# Patient Record
Sex: FEMALE | Race: WHITE | Employment: FULL TIME | URBAN - METROPOLITAN AREA
[De-identification: names, ages, dates, MRNs, and addresses within clinical notes are randomized per-mention and may not be internally consistent; named-entity substitution may affect disease eponyms.]

---

## 2017-01-30 ENCOUNTER — OFFICE VISIT (OUTPATIENT)
Dept: NEUROLOGY | Facility: CLINIC | Age: 57
End: 2017-01-30

## 2017-01-30 VITALS
DIASTOLIC BLOOD PRESSURE: 72 MMHG | SYSTOLIC BLOOD PRESSURE: 108 MMHG | BODY MASS INDEX: 33 KG/M2 | RESPIRATION RATE: 14 BRPM | WEIGHT: 194 LBS | HEART RATE: 78 BPM

## 2017-01-30 DIAGNOSIS — G40.909 SEIZURE DISORDER (HCC): Primary | ICD-10-CM

## 2017-01-30 PROCEDURE — 99215 OFFICE O/P EST HI 40 MIN: CPT | Performed by: OTHER

## 2017-01-30 RX ORDER — OMEPRAZOLE 40 MG/1
CAPSULE, DELAYED RELEASE ORAL DAILY
Refills: 6 | COMMUNITY
Start: 2017-01-04 | End: 2019-05-13 | Stop reason: ALTCHOICE

## 2017-01-30 RX ORDER — LAMOTRIGINE 200 MG/1
200 TABLET ORAL DAILY
Qty: 90 TABLET | Refills: 3 | Status: SHIPPED | OUTPATIENT
Start: 2017-01-30 | End: 2017-11-20

## 2017-01-30 NOTE — PROGRESS NOTES
[facility] Neurology outpatient progress note  Date of service: 1/30/2017    Patient here to follow up regarding seizures. States she has been doing well, here to discuss the next steps. She is on lamictal 200 mg nightly, no adverse effect reported.   Himanshu Shelton Hypertension Mother      Neurological examination:  /72 mmHg  Pulse 78  Resp 14  Wt 194 lb  LMP 12/24/2015  A & O X 3   Language - nl   Speech - nl   CN - intact   Motor 5/5 all extremities   Tone - nl   DTRs 2+ symmetric  Plantar response: bilateral

## 2017-01-30 NOTE — PROGRESS NOTES
Patient here to follow up regarding seizures. States she has been doing well, here to discuss the next steps.

## 2017-01-30 NOTE — PATIENT INSTRUCTIONS
Refill policies:    • Allow 2 business days for refills; controlled substances may take longer.   • Contact your pharmacy at least 5 days prior to running out of medication and have them send an electronic request or submit request through the “request re your physician has recommended that you have a procedure or additional testing performed. [facility] will contact your insurance carrier to obtain pre-certification or prior authorization.     Unfortunately, PAPI has seen an increas

## 2017-11-20 DIAGNOSIS — G40.909 SEIZURE DISORDER (HCC): Primary | ICD-10-CM

## 2017-11-20 RX ORDER — LAMOTRIGINE 200 MG/1
200 TABLET ORAL DAILY
Qty: 90 TABLET | Refills: 0 | Status: SHIPPED | OUTPATIENT
Start: 2017-11-20 | End: 2018-01-04

## 2017-11-20 NOTE — TELEPHONE ENCOUNTER
Medication: Lamictal    Date of last refill: 1/4/17  [place]    Last office visit: 1/30/17  Due back to clinic per last office note:  1 year  Date next office visit scheduled:  1/4/18    Last OV note recommendation: p

## 2018-01-04 ENCOUNTER — OFFICE VISIT (OUTPATIENT)
Dept: NEUROLOGY | Facility: CLINIC | Age: 58
End: 2018-01-04

## 2018-01-04 VITALS
HEART RATE: 84 BPM | RESPIRATION RATE: 16 BRPM | DIASTOLIC BLOOD PRESSURE: 72 MMHG | SYSTOLIC BLOOD PRESSURE: 110 MMHG | WEIGHT: 196 LBS | BODY MASS INDEX: 34 KG/M2

## 2018-01-04 DIAGNOSIS — G40.909 SEIZURE DISORDER (HCC): ICD-10-CM

## 2018-01-04 PROCEDURE — 99214 OFFICE O/P EST MOD 30 MIN: CPT | Performed by: OTHER

## 2018-01-04 RX ORDER — LAMOTRIGINE 200 MG/1
200 TABLET ORAL DAILY
Qty: 90 TABLET | Refills: 4 | Status: SHIPPED | OUTPATIENT
Start: 2018-01-04 | End: 2019-02-25

## 2018-01-04 NOTE — PATIENT INSTRUCTIONS
Refill policies:    • Allow 2-3 business days for refills; controlled substances may take longer.   • Contact your pharmacy at least 5 days prior to running out of medication and have them send an electronic request or submit request through the [facility] have a procedure or additional testing performed. [facility]) will contact your insurance carrier to obtain pre-certification or prior authorization.     Unfortunately, PAPI has seen an increase in denial of payment even though the p

## 2018-01-04 NOTE — PROGRESS NOTES
[facility] Neurology outpatient progress note  Date of service: 1/4/2018    Patient here to follow up regarding seizures. States she has been doing well, no seizure since last visit. Tolerating current medication with no side effects.    She is on lamictal 200 mg Neurological Examination:  /72   Pulse 84   Resp 16   Wt 196 lb   LMP 12/24/2015   BMI 33.64 kg/m²   Mental status: A & O X 3  Language: no aphasia  Speech: no dysarthria  CN II-XII: intact   Motor strength: 5/5 all extremities  Tone: normal  DTRs:

## 2018-01-04 NOTE — PROGRESS NOTES
Patient here to follow up regarding seizures. States she has been doing well, no seizures. Here to discuss the next steps.

## 2018-01-21 ENCOUNTER — OFFICE VISIT (OUTPATIENT)
Dept: FAMILY MEDICINE CLINIC | Facility: CLINIC | Age: 58
End: 2018-01-21

## 2018-01-21 VITALS
TEMPERATURE: 98 F | OXYGEN SATURATION: 96 % | HEART RATE: 107 BPM | BODY MASS INDEX: 34 KG/M2 | SYSTOLIC BLOOD PRESSURE: 110 MMHG | RESPIRATION RATE: 18 BRPM | WEIGHT: 199 LBS | DIASTOLIC BLOOD PRESSURE: 60 MMHG

## 2018-01-21 DIAGNOSIS — J06.9 ACUTE URI: Primary | ICD-10-CM

## 2018-01-21 DIAGNOSIS — Z87.09 HISTORY OF BRONCHITIS: ICD-10-CM

## 2018-01-21 DIAGNOSIS — Z87.01 HISTORY OF PNEUMONIA: ICD-10-CM

## 2018-01-21 PROCEDURE — 99203 OFFICE O/P NEW LOW 30 MIN: CPT | Performed by: NURSE PRACTITIONER

## 2018-01-21 RX ORDER — AZITHROMYCIN 250 MG/1
TABLET, FILM COATED ORAL
Qty: 6 TABLET | Refills: 0 | Status: SHIPPED | OUTPATIENT
Start: 2018-01-21 | End: 2019-05-13 | Stop reason: ALTCHOICE

## 2018-01-21 RX ORDER — ALBUTEROL SULFATE 90 UG/1
2 AEROSOL, METERED RESPIRATORY (INHALATION) EVERY 6 HOURS PRN
Qty: 1 INHALER | Refills: 0 | Status: SHIPPED | OUTPATIENT
Start: 2018-01-21 | End: 2019-05-13 | Stop reason: ALTCHOICE

## 2018-01-21 NOTE — PROGRESS NOTES
CHIEF COMPLAINT:   Patient presents with:  Cough: pt c\o of uri x 1wk       HPI:   Jony Cole is a 65year old female who presents for upper respiratory symptoms for  1 weeks.  Patient reports congestion, cough with white colored sputum, wheezing, prior hist and in no apparent distress, afebrile  SKIN: no rashes, no suspicious lesions  HEAD: atraumatic, normocephalic.  mild tenderness on palpation of maxillarysinuses.   EYES: conjunctiva clear, EOM intact  EARS: TM's clear, no bulging, no retraction,clear right

## 2018-03-23 DIAGNOSIS — G40.909 SEIZURE DISORDER (HCC): ICD-10-CM

## 2018-03-23 RX ORDER — LAMOTRIGINE 200 MG/1
200 TABLET ORAL DAILY
Qty: 90 TABLET | Refills: 0 | OUTPATIENT
Start: 2018-03-23

## 2018-03-23 NOTE — TELEPHONE ENCOUNTER
Per Epic review, should have refills on file with requesting pharmacy. Pharmacy was still closed at 0930. Will call again later. Called and spoke with Samuel Sterling at pharmacy and confirmed that this was sent in error, patient has refills on file.  Refused at t

## 2019-02-25 DIAGNOSIS — G40.909 SEIZURE DISORDER (HCC): ICD-10-CM

## 2019-02-25 RX ORDER — LAMOTRIGINE 200 MG/1
200 TABLET ORAL DAILY
Qty: 30 TABLET | Refills: 0 | Status: SHIPPED | OUTPATIENT
Start: 2019-02-25 | End: 2019-02-28

## 2019-02-25 NOTE — TELEPHONE ENCOUNTER
Spoke with the pharmacist who states the patient was unable to obtain the last refill due to the prescription expiring.      Medication: Lamotrigine 200 MG Tablet    Date of last refill: 01/04/18 (#90/4)  [place]    L

## 2019-02-26 DIAGNOSIS — G40.909 SEIZURE DISORDER (HCC): ICD-10-CM

## 2019-02-26 RX ORDER — LAMOTRIGINE 200 MG/1
200 TABLET ORAL DAILY
Qty: 90 TABLET | Refills: 0 | OUTPATIENT
Start: 2019-02-26

## 2019-02-26 NOTE — TELEPHONE ENCOUNTER
Spoke with the pharmacist who states that the pharmacy rec'd the refill request and to disregard the refill.        Medication: LAMOTRIGINE 200 MG Oral Tab    Date of last refill: 02/25/19 (#30/0)  [place]    Last off

## 2019-02-28 DIAGNOSIS — G40.909 SEIZURE DISORDER (HCC): ICD-10-CM

## 2019-02-28 RX ORDER — LAMOTRIGINE 200 MG/1
200 TABLET ORAL DAILY
Qty: 30 TABLET | Refills: 2 | Status: SHIPPED | OUTPATIENT
Start: 2019-02-28 | End: 2019-06-05

## 2019-02-28 NOTE — TELEPHONE ENCOUNTER
The patient insurance is requesting a 90 day supply.      Medication: LAMOTRIGINE 200 MG TABLET    Date of last refill: 02/25/19 (#30/0)  [place]    Last office visit: 01/04/18  Due back to clinic per last office note

## 2019-03-12 ENCOUNTER — TELEPHONE (OUTPATIENT)
Dept: NEUROLOGY | Facility: CLINIC | Age: 59
End: 2019-03-12

## 2019-03-12 NOTE — TELEPHONE ENCOUNTER
Per Epic review, pt given refill on 2/28/19, #30/2 additional refills. Checked with Rosalina at pharmacy, confirmed there are still refills on file. Informed pt. Verbalized understanding. States she still has medication left at home too.     Is due for ap

## 2019-05-13 ENCOUNTER — APPOINTMENT (OUTPATIENT)
Dept: LAB | Age: 59
End: 2019-05-13
Attending: Other
Payer: COMMERCIAL

## 2019-05-13 ENCOUNTER — OFFICE VISIT (OUTPATIENT)
Dept: NEUROLOGY | Facility: CLINIC | Age: 59
End: 2019-05-13
Payer: COMMERCIAL

## 2019-05-13 VITALS
RESPIRATION RATE: 14 BRPM | BODY MASS INDEX: 35 KG/M2 | SYSTOLIC BLOOD PRESSURE: 110 MMHG | DIASTOLIC BLOOD PRESSURE: 70 MMHG | WEIGHT: 201 LBS | HEART RATE: 78 BPM

## 2019-05-13 DIAGNOSIS — G40.909 SEIZURE DISORDER (HCC): Primary | ICD-10-CM

## 2019-05-13 DIAGNOSIS — G40.909 SEIZURE DISORDER (HCC): ICD-10-CM

## 2019-05-13 PROCEDURE — 80175 DRUG SCREEN QUAN LAMOTRIGINE: CPT | Performed by: OTHER

## 2019-05-13 PROCEDURE — 99215 OFFICE O/P EST HI 40 MIN: CPT | Performed by: OTHER

## 2019-05-13 PROCEDURE — 36415 COLL VENOUS BLD VENIPUNCTURE: CPT | Performed by: OTHER

## 2019-05-13 RX ORDER — OMEPRAZOLE 20 MG/1
20 TABLET, DELAYED RELEASE ORAL DAILY
COMMUNITY
End: 2019-07-09 | Stop reason: ALTCHOICE

## 2019-05-13 NOTE — PROGRESS NOTES
[facility] Neurology outpatient progress note  Date of service: 5/13/2019    Patient here to follow up regarding seizures. Has had 3 very small episodes (\"aura type weird sensation\", no convulsive seizure) on the same day approx 2/2019.    Prior she has been sei of Onset   • Seizure Disorder Cousin    • Seizure Disorder Paternal Uncle    • Hypertension Mother       Physical Examination:  /70   Pulse 78   Resp 14   Wt 201 lb   LMP 12/24/2015   BMI 34.50 kg/m²   Lungs: clear to auscultation   CV: S1, S2+  Abdo

## 2019-05-13 NOTE — PROGRESS NOTES
Patient here to follow up regarding seizures. Has had 3 very small episodes on the same day approx 2/2019.

## 2019-05-24 ENCOUNTER — TELEPHONE (OUTPATIENT)
Dept: NEUROLOGY | Facility: CLINIC | Age: 59
End: 2019-05-24

## 2019-05-24 NOTE — TELEPHONE ENCOUNTER
Called pt, no answer, left voicemail with lab results (OK per HIPPA) Encouraged pt to call office with any further questions.    ----- Message from Julia lBount MD sent at 5/23/2019  4:48 PM CDT -----  Your lamictal level is at low normal range.

## 2019-06-05 ENCOUNTER — NURSE ONLY (OUTPATIENT)
Dept: NEUROLOGY | Facility: CLINIC | Age: 59
End: 2019-06-05
Payer: COMMERCIAL

## 2019-06-05 ENCOUNTER — TELEPHONE (OUTPATIENT)
Dept: NEUROLOGY | Facility: CLINIC | Age: 59
End: 2019-06-05

## 2019-06-05 DIAGNOSIS — G40.909 SEIZURE DISORDER (HCC): ICD-10-CM

## 2019-06-05 PROCEDURE — 95816 EEG AWAKE AND DROWSY: CPT | Performed by: OTHER

## 2019-06-05 RX ORDER — LAMOTRIGINE 200 MG/1
200 TABLET ORAL 2 TIMES DAILY
Qty: 60 TABLET | Refills: 2 | Status: SHIPPED | OUTPATIENT
Start: 2019-06-05 | End: 2019-07-09

## 2019-06-05 NOTE — TELEPHONE ENCOUNTER
Patient informed of below and that new RX has been sent to pharmacy. Patient asking how bad EEG was. Advised patient she will have to discuss this with Dr Jayro Sanchez. Patient given 7/9/19 9 am OV. Patient advised not to drive until appointment.  Patient verbalize

## 2019-06-05 NOTE — PROCEDURES
Date of Procedure: 6/5/2019    Procedure: EEG (ELECTROENCEPHALOGRAM)     HX: PT IS A 64 YO FEMALE WHO HAS BEEN HAVING AURAS ACCOMPANIED BY WIERD SENSATIONS THAT SHE CANT DESCRIBE. PT HAS PREVIOUS SZ DX BUT THEY HAVE BEEN CONTROLED FOR MANY YEARS BY AEDS.

## 2019-06-05 NOTE — TELEPHONE ENCOUNTER
I tried to call pt myself discussing about eeg result and medication change, no one answer, no option to leave voicemail.     Please call pt : her EEG is abnormal and high risk to have seizure, thus I will need to increase lamictal 200 mg bid for seizure pr

## 2019-06-06 NOTE — TELEPHONE ENCOUNTER
Spoke to pt myself, relayed eeg finding and care plan, no driving or operating heavy machinery advised once again, needs to follow up with me in 6 weeks. She verbalized understanding.

## 2019-06-20 DIAGNOSIS — G40.909 SEIZURE DISORDER (HCC): ICD-10-CM

## 2019-06-20 NOTE — TELEPHONE ENCOUNTER
Spoke with the pharmacy who states to disregard the refill request.     Medication: LAMOTRIGINE 200 MG Oral Tab    Date of last refill: 06/05/19 (#60/2)  [place]    Last office visit: 5/13/2019  Due back to clinic pe

## 2019-06-21 RX ORDER — LAMOTRIGINE 200 MG/1
TABLET ORAL
Qty: 30 TABLET | Refills: 2 | OUTPATIENT
Start: 2019-06-21

## 2019-07-09 ENCOUNTER — OFFICE VISIT (OUTPATIENT)
Dept: NEUROLOGY | Facility: CLINIC | Age: 59
End: 2019-07-09
Payer: COMMERCIAL

## 2019-07-09 VITALS
SYSTOLIC BLOOD PRESSURE: 120 MMHG | BODY MASS INDEX: 33 KG/M2 | WEIGHT: 194 LBS | HEART RATE: 68 BPM | RESPIRATION RATE: 16 BRPM | DIASTOLIC BLOOD PRESSURE: 78 MMHG

## 2019-07-09 DIAGNOSIS — G40.909 SEIZURE DISORDER (HCC): Primary | ICD-10-CM

## 2019-07-09 PROCEDURE — 99215 OFFICE O/P EST HI 40 MIN: CPT | Performed by: OTHER

## 2019-07-09 RX ORDER — LAMOTRIGINE 200 MG/1
200 TABLET ORAL 2 TIMES DAILY
Qty: 180 TABLET | Refills: 3 | Status: SHIPPED | OUTPATIENT
Start: 2019-07-09 | End: 2019-11-11

## 2019-07-09 NOTE — PROGRESS NOTES
[facility] Neurology outpatient progress note  Date of service: 7/9/2019    Patient here to follow up regarding seizures.  Has no new seizure like activity since lamictal was increased to 200 mg bid, she reportedly had 3 very small episodes (\"aura type weird sens Yes      Alcohol/week: 0.0 oz      Comment: rare    Family History   Problem Relation Age of Onset   • Seizure Disorder Cousin    • Seizure Disorder Paternal Uncle    • Hypertension Mother       Physical Examination:  /78   Pulse 68   Resp 16   Wt 19 MD  Neurology  [facility]  7/9/2019, 9:33 AM  CATHERINE [address] [facility]

## 2019-10-23 ENCOUNTER — OFFICE VISIT (OUTPATIENT)
Dept: DERMATOLOGY | Age: 59
End: 2019-10-23

## 2019-10-23 DIAGNOSIS — D23.5 BENIGN NEOPLASM OF SKIN OF TRUNK, EXCEPT SCROTUM: ICD-10-CM

## 2019-10-23 DIAGNOSIS — B07.9 VERRUCA VULGARIS: Primary | ICD-10-CM

## 2019-10-23 DIAGNOSIS — D23.70 BENIGN NEOPLASM OF SKIN OF LOWER EXTREMITY, INCLUDING HIP, UNSPECIFIED LATERALITY: ICD-10-CM

## 2019-10-23 DIAGNOSIS — L91.8 INFLAMED SKIN TAG: ICD-10-CM

## 2019-10-23 PROCEDURE — 99202 OFFICE O/P NEW SF 15 MIN: CPT | Performed by: DERMATOLOGY

## 2019-10-23 PROCEDURE — 11301 SHAVE SKIN LESION 0.6-1.0 CM: CPT | Performed by: DERMATOLOGY

## 2019-10-23 PROCEDURE — 11200 RMVL SKIN TAGS UP TO&INC 15: CPT | Performed by: DERMATOLOGY

## 2019-10-23 PROCEDURE — 11300 SHAVE SKIN LESION 0.5 CM/<: CPT | Performed by: DERMATOLOGY

## 2019-10-23 RX ORDER — FLUOXETINE HYDROCHLORIDE 20 MG/1
20 CAPSULE ORAL DAILY
Refills: 1 | COMMUNITY
Start: 2019-10-14

## 2019-10-23 RX ORDER — LAMOTRIGINE 200 MG/1
200 TABLET ORAL
COMMUNITY
Start: 2019-07-09

## 2019-10-28 LAB — PATH REPORT PLASRBC-IMP: NORMAL

## 2019-11-11 ENCOUNTER — OFFICE VISIT (OUTPATIENT)
Dept: NEUROLOGY | Facility: CLINIC | Age: 59
End: 2019-11-11
Payer: COMMERCIAL

## 2019-11-11 VITALS
DIASTOLIC BLOOD PRESSURE: 70 MMHG | WEIGHT: 200 LBS | HEART RATE: 84 BPM | SYSTOLIC BLOOD PRESSURE: 118 MMHG | BODY MASS INDEX: 34 KG/M2 | RESPIRATION RATE: 16 BRPM

## 2019-11-11 DIAGNOSIS — G40.909 SEIZURE DISORDER (HCC): ICD-10-CM

## 2019-11-11 PROCEDURE — 99214 OFFICE O/P EST MOD 30 MIN: CPT | Performed by: OTHER

## 2019-11-11 RX ORDER — LAMOTRIGINE 200 MG/1
200 TABLET ORAL 2 TIMES DAILY
Qty: 180 TABLET | Refills: 3 | Status: SHIPPED | OUTPATIENT
Start: 2019-11-11 | End: 2020-11-24

## 2019-11-11 NOTE — PROGRESS NOTES
[facility] Neurology outpatient progress note  Date of service: 11/11/2019    Patient here to follow up regarding seizures.  Has no new seizure like activity since last visit, she has been on lamictal 200 mg bid, she reportedly had 3 very small episodes (\"aura ty tobacco: Never Used    Alcohol use:  Yes      Alcohol/week: 0.0 standard drinks      Comment: rare    Family History   Problem Relation Age of Onset   • Seizure Disorder Cousin    • Seizure Disorder Paternal Uncle    • Hypertension Mother       Neurological

## 2020-11-23 DIAGNOSIS — G40.909 SEIZURE DISORDER (HCC): ICD-10-CM

## 2020-11-24 RX ORDER — LAMOTRIGINE 200 MG/1
200 TABLET ORAL 2 TIMES DAILY
Qty: 180 TABLET | Refills: 3 | Status: SHIPPED | OUTPATIENT
Start: 2020-11-24 | End: 2021-04-19

## 2020-11-24 NOTE — TELEPHONE ENCOUNTER
Medication: Lamotrigine 200 mg    Date of last refill: 11/11/2019(#180/3)  [place]    Last office visit: 11/11/2019  Due back to clinic per last office note:  1 year  Date next office visit scheduled:  No future appointm

## 2020-12-09 ENCOUNTER — WALK IN (OUTPATIENT)
Dept: URGENT CARE | Age: 60
End: 2020-12-09

## 2020-12-09 DIAGNOSIS — Z20.822 CLOSE EXPOSURE TO COVID-19 VIRUS: Primary | ICD-10-CM

## 2020-12-09 DIAGNOSIS — R05.9 COUGH: ICD-10-CM

## 2020-12-09 LAB — SARS-COV-2 AG RESP QL IA.RAPID: NOT DETECTED

## 2020-12-09 PROCEDURE — 99202 OFFICE O/P NEW SF 15 MIN: CPT | Performed by: FAMILY MEDICINE

## 2020-12-09 PROCEDURE — 87426 SARSCOV CORONAVIRUS AG IA: CPT | Performed by: FAMILY MEDICINE

## 2020-12-09 RX ORDER — FLUOXETINE 10 MG/1
10 CAPSULE ORAL DAILY
COMMUNITY
Start: 2020-09-30

## 2020-12-09 ASSESSMENT — PAIN SCALES - GENERAL: PAINLEVEL: 0

## 2021-03-24 ENCOUNTER — TELEPHONE (OUTPATIENT)
Dept: NEUROLOGY | Facility: CLINIC | Age: 61
End: 2021-03-24

## 2021-03-24 DIAGNOSIS — G40.909 SEIZURE DISORDER (HCC): ICD-10-CM

## 2021-03-24 NOTE — TELEPHONE ENCOUNTER
Patient notified that since she is over due for a f/u chante't ([address] 11/2019), a 90 day refill cannot be done- patient scheduled a f/u chante't with Dr Emir Kelsey on 4/19 and can get a 90 day supply at that time if appropriate.

## 2021-03-29 RX ORDER — LAMOTRIGINE 200 MG/1
200 TABLET ORAL 2 TIMES DAILY
Qty: 180 TABLET | Refills: 3 | OUTPATIENT
Start: 2021-03-29

## 2021-04-19 ENCOUNTER — OFFICE VISIT (OUTPATIENT)
Dept: NEUROLOGY | Facility: CLINIC | Age: 61
End: 2021-04-19
Payer: COMMERCIAL

## 2021-04-19 VITALS — RESPIRATION RATE: 16 BRPM | HEART RATE: 78 BPM | DIASTOLIC BLOOD PRESSURE: 78 MMHG | SYSTOLIC BLOOD PRESSURE: 118 MMHG

## 2021-04-19 DIAGNOSIS — G40.909 SEIZURE DISORDER (HCC): ICD-10-CM

## 2021-04-19 PROCEDURE — 3074F SYST BP LT 130 MM HG: CPT | Performed by: OTHER

## 2021-04-19 PROCEDURE — 3078F DIAST BP <80 MM HG: CPT | Performed by: OTHER

## 2021-04-19 PROCEDURE — 99214 OFFICE O/P EST MOD 30 MIN: CPT | Performed by: OTHER

## 2021-04-19 RX ORDER — LAMOTRIGINE 200 MG/1
200 TABLET ORAL 2 TIMES DAILY
Qty: 180 TABLET | Refills: 3 | Status: SHIPPED | OUTPATIENT
Start: 2021-04-19 | End: 2022-01-17

## 2021-05-26 VITALS
TEMPERATURE: 99.8 F | OXYGEN SATURATION: 97 % | SYSTOLIC BLOOD PRESSURE: 116 MMHG | HEART RATE: 84 BPM | DIASTOLIC BLOOD PRESSURE: 72 MMHG | RESPIRATION RATE: 16 BRPM

## 2021-11-01 ENCOUNTER — TELEPHONE (OUTPATIENT)
Dept: DERMATOLOGY | Age: 61
End: 2021-11-01

## 2021-11-03 ENCOUNTER — OFFICE VISIT (OUTPATIENT)
Dept: DERMATOLOGY | Age: 61
End: 2021-11-03

## 2021-11-03 DIAGNOSIS — L91.8 SKIN TAG: ICD-10-CM

## 2021-11-03 DIAGNOSIS — L72.11 PILAR CYST: Primary | ICD-10-CM

## 2021-11-03 DIAGNOSIS — L82.1 SEBORRHEIC KERATOSIS: ICD-10-CM

## 2021-11-03 PROCEDURE — 99213 OFFICE O/P EST LOW 20 MIN: CPT | Performed by: DERMATOLOGY

## 2021-11-03 RX ORDER — LEVOTHYROXINE SODIUM 0.03 MG/1
TABLET ORAL
COMMUNITY

## 2021-11-03 RX ORDER — POLYMYXIN B SULFATE AND TRIMETHOPRIM 1; 10000 MG/ML; [USP'U]/ML
SOLUTION OPHTHALMIC
COMMUNITY
Start: 2021-10-15

## 2021-11-03 RX ORDER — POLYMYXIN B SULFATE AND TRIMETHOPRIM 1; 10000 MG/ML; [USP'U]/ML
SOLUTION OPHTHALMIC
COMMUNITY

## 2021-11-03 RX ORDER — CHLORHEXIDINE GLUCONATE ORAL RINSE 1.2 MG/ML
SOLUTION DENTAL
COMMUNITY

## 2021-11-03 RX ORDER — NITROFURANTOIN 25; 75 MG/1; MG/1
CAPSULE ORAL EVERY 12 HOURS SCHEDULED
COMMUNITY

## 2021-11-03 RX ORDER — HYDROCODONE BITARTRATE AND ACETAMINOPHEN 5; 325 MG/1; MG/1
TABLET ORAL
COMMUNITY

## 2021-11-03 RX ORDER — CLINDAMYCIN HYDROCHLORIDE 150 MG/1
CAPSULE ORAL
COMMUNITY

## 2021-11-03 RX ORDER — FLUOXETINE 20 MG/1
TABLET, FILM COATED ORAL
COMMUNITY
Start: 2021-08-14

## 2021-11-03 RX ORDER — AMOXICILLIN 500 MG/1
CAPSULE ORAL
COMMUNITY

## 2021-12-21 DIAGNOSIS — G40.909 SEIZURE DISORDER (HCC): ICD-10-CM

## 2021-12-21 RX ORDER — LAMOTRIGINE 200 MG/1
TABLET ORAL
Qty: 180 TABLET | Refills: 3 | OUTPATIENT
Start: 2021-12-21

## 2022-01-17 ENCOUNTER — TELEPHONE (OUTPATIENT)
Dept: SURGERY | Facility: CLINIC | Age: 62
End: 2022-01-17

## 2022-01-17 DIAGNOSIS — G40.909 SEIZURE DISORDER (HCC): ICD-10-CM

## 2022-01-17 RX ORDER — LAMOTRIGINE 200 MG/1
200 TABLET ORAL 2 TIMES DAILY
Qty: 180 TABLET | Refills: 0 | Status: SHIPPED | OUTPATIENT
Start: 2022-01-17

## 2022-01-17 NOTE — TELEPHONE ENCOUNTER
Patient states she is going out of town Wednesday 01/19/22 and requesting this be refilled ASAP.    Patient calling to request refill of lamoTRIgine 200 MG Oral Tab  Pharmacy [facility] [id number] [place] [employer] - [address] [address] [phone], [phone]

## 2022-01-17 NOTE — TELEPHONE ENCOUNTER
Patient has refills available through [facility]. Called patient to inform of above and patient reports she has NOT been receiving prescriptions from [facility] and would like [facility] [place].     RN called [facility] to can

## 2022-04-12 RX ORDER — LAMOTRIGINE 200 MG/1
TABLET ORAL
Qty: 180 TABLET | Refills: 0 | Status: SHIPPED | OUTPATIENT
Start: 2022-04-12

## 2022-06-22 DIAGNOSIS — G40.909 SEIZURE DISORDER (HCC): ICD-10-CM

## 2022-06-23 RX ORDER — LAMOTRIGINE 200 MG/1
TABLET ORAL
Qty: 180 TABLET | Refills: 0 | Status: SHIPPED | OUTPATIENT
Start: 2022-06-23

## 2022-08-08 ENCOUNTER — OFFICE VISIT (OUTPATIENT)
Dept: NEUROLOGY | Facility: CLINIC | Age: 62
End: 2022-08-08
Payer: COMMERCIAL

## 2022-08-08 VITALS — SYSTOLIC BLOOD PRESSURE: 128 MMHG | DIASTOLIC BLOOD PRESSURE: 82 MMHG | HEART RATE: 70 BPM | RESPIRATION RATE: 16 BRPM

## 2022-08-08 DIAGNOSIS — G40.909 SEIZURE DISORDER (HCC): Primary | ICD-10-CM

## 2022-08-08 RX ORDER — LAMOTRIGINE 200 MG/1
400 TABLET, EXTENDED RELEASE ORAL NIGHTLY
Qty: 180 TABLET | Refills: 3 | Status: SHIPPED | OUTPATIENT
Start: 2022-08-08 | End: 2023-08-03

## 2022-08-12 DIAGNOSIS — G40.909 SEIZURE DISORDER (HCC): ICD-10-CM

## 2022-08-15 RX ORDER — LAMOTRIGINE 200 MG/1
TABLET ORAL
Qty: 180 TABLET | Refills: 0 | OUTPATIENT
Start: 2022-08-15

## 2022-08-30 DIAGNOSIS — G40.909 SEIZURE DISORDER (HCC): ICD-10-CM

## 2022-08-30 NOTE — TELEPHONE ENCOUNTER
Patient called back and stated Lamotrigine ER was too expensive and would like to go back to the Lamotrigine 200 mg 1 tablet by mouth BID.

## 2022-08-31 RX ORDER — LAMOTRIGINE 200 MG/1
200 TABLET ORAL 2 TIMES DAILY
Qty: 180 TABLET | Refills: 3 | Status: SHIPPED | OUTPATIENT
Start: 2022-08-31

## 2022-09-14 ENCOUNTER — TELEPHONE (OUTPATIENT)
Dept: SURGERY | Facility: CLINIC | Age: 62
End: 2022-09-14

## 2022-09-14 NOTE — TELEPHONE ENCOUNTER
Pt calling regarding medication lamoTRIgine 200 MG Oral Tab. Pt states she was on a standard form of this medication and was asked if she would like to try the extended version of this medication. She went to  the medication and it was $900.00. Pt requesting the old form of the prescription be sent she is unable to afford this medication.     [facility] [id number] [place] [address], [phone]

## 2022-09-14 NOTE — TELEPHONE ENCOUNTER
LMTCB and advised (OK per HIPPA) Advised patient that Medication for the original script was sent to the pharmacy on 08/31/2022 for #180 tablets and 3 refills. Patient advised to check with pharmacy.

## 2023-07-26 DIAGNOSIS — G89.18 POST-OP PAIN: Primary | ICD-10-CM

## 2023-07-26 RX ORDER — GABAPENTIN 300 MG/1
300 CAPSULE ORAL 3 TIMES DAILY
Qty: 30 CAPSULE | Refills: 0 | Status: SHIPPED | OUTPATIENT
Start: 2023-07-26

## 2023-07-26 RX ORDER — ONDANSETRON 4 MG/1
4 TABLET, FILM COATED ORAL EVERY 8 HOURS PRN
Qty: 10 TABLET | Refills: 0 | Status: SHIPPED | OUTPATIENT
Start: 2023-07-26

## 2023-07-26 RX ORDER — CEPHALEXIN 500 MG/1
500 CAPSULE ORAL 3 TIMES DAILY
Qty: 15 CAPSULE | Refills: 0 | Status: SHIPPED | OUTPATIENT
Start: 2023-07-26 | End: 2023-07-31

## 2023-07-26 RX ORDER — TRAMADOL HYDROCHLORIDE 50 MG/1
50 TABLET ORAL EVERY 6 HOURS PRN
Qty: 20 TABLET | Refills: 0 | Status: SHIPPED | OUTPATIENT
Start: 2023-07-26

## 2023-07-26 RX ORDER — ACETAMINOPHEN 500 MG
1000 TABLET ORAL EVERY 8 HOURS
Qty: 40 TABLET | Refills: 0 | Status: SHIPPED | OUTPATIENT
Start: 2023-07-26

## 2023-08-01 DIAGNOSIS — G89.18 POST-OP PAIN: Primary | ICD-10-CM

## 2023-08-01 RX ORDER — OXYCODONE HYDROCHLORIDE 5 MG/1
5 TABLET ORAL EVERY 6 HOURS PRN
Qty: 15 TABLET | Refills: 0 | Status: SHIPPED | OUTPATIENT
Start: 2023-08-01

## 2023-08-04 ENCOUNTER — LAB REQUISITION (OUTPATIENT)
Dept: LAB | Age: 63
End: 2023-08-04

## 2023-08-04 DIAGNOSIS — N62 HYPERTROPHY OF BREAST: ICD-10-CM

## 2023-08-04 PROCEDURE — 88305 TISSUE EXAM BY PATHOLOGIST: CPT | Performed by: CLINICAL MEDICAL LABORATORY

## 2023-08-09 LAB
ASR DISCLAIMER: NORMAL
CASE RPRT: NORMAL
CLINICAL INFO: NORMAL
PATH REPORT.FINAL DX SPEC: NORMAL
PATH REPORT.GROSS SPEC: NORMAL

## 2023-08-18 DIAGNOSIS — G40.909 SEIZURE DISORDER (HCC): ICD-10-CM

## 2023-08-18 RX ORDER — LAMOTRIGINE 200 MG/1
200 TABLET ORAL 2 TIMES DAILY
Qty: 60 TABLET | Refills: 0 | Status: SHIPPED | OUTPATIENT
Start: 2023-08-18

## 2023-08-18 NOTE — TELEPHONE ENCOUNTER
Called [facility] to schedule follow up appointment. Pt is at work and will call back to schedule. Medication: LAMOTRIGINE 200 MG Oral Tab     Date of last refill: 08/31/22 (180/3)  [place] 62/96/14    Last office visit: 08/08/22  Due back to clinic per last office note:  1 year  Date next office visit scheduled:  No future appointments. Last OV note recommendation:     Plan:  Cont lamictal 400 mg/day, can try  mg x 2 or cont current 200 mg bid, refills given  PCP follow up  Avoid sleep deprivation  See orders and medications filed with this encounter. The patient indicates understanding of these issues and agrees with the plan. Discussed with patient in detail regarding the adverse and side effects of the medications.   RTC 12 months or prn

## 2023-09-15 DIAGNOSIS — G40.909 SEIZURE DISORDER (HCC): ICD-10-CM

## 2023-09-18 RX ORDER — LAMOTRIGINE 200 MG/1
200 TABLET ORAL 2 TIMES DAILY
Qty: 60 TABLET | Refills: 2 | Status: SHIPPED | OUTPATIENT
Start: 2023-09-18

## 2023-12-13 DIAGNOSIS — G40.909 SEIZURE DISORDER (HCC): ICD-10-CM

## 2023-12-13 RX ORDER — LAMOTRIGINE 200 MG/1
200 TABLET ORAL 2 TIMES DAILY
Qty: 60 TABLET | Refills: 0 | Status: SHIPPED | OUTPATIENT
Start: 2023-12-13

## 2023-12-13 NOTE — TELEPHONE ENCOUNTER
Medication: LAMOTRIGINE 200 MG Oral Tab     Date of last refill: 09/18/23 (60/2)  [place] 74/44/29    Last office visit: 08/08/22  Due back to clinic per last office note:  1 year  Date next office visit scheduled:  No future appointments. Last OV note recommendation:     Plan:  Cont lamictal 400 mg/day, can try  mg x 2 or cont current 200 mg bid, refills given  PCP follow up  Avoid sleep deprivation  See orders and medications filed with this encounter. The patient indicates understanding of these issues and agrees with the plan. Discussed with patient in detail regarding the adverse and side effects of the medications.   RTC 12 months or prn 08-Dec-2019 10:15

## 2024-01-15 DIAGNOSIS — G40.909 SEIZURE DISORDER (HCC): ICD-10-CM

## 2024-01-15 RX ORDER — LAMOTRIGINE 200 MG/1
200 TABLET ORAL 2 TIMES DAILY
Qty: 60 TABLET | Refills: 0 | OUTPATIENT
Start: 2024-01-15

## 2024-01-15 RX ORDER — LAMOTRIGINE 200 MG/1
200 TABLET ORAL 2 TIMES DAILY
Qty: 60 TABLET | Refills: 0 | Status: SHIPPED | OUTPATIENT
Start: 2024-01-15

## 2024-01-15 NOTE — TELEPHONE ENCOUNTER
Medication: Lamotrigine 200 mg     Date of last refill: 12/13/2023 (#60/0)-not picked up  [place]     Last office visit: 08/08/2022  Due back to clinic per last office note:  1 year  Date next office visit scheduled:    No future appointments.        Last OV note recommendation:    Assessment:   Seizure disorder (HCC)  (primary encounter diagnosis): complex partial seizure likely, requires AED; no seizure since last visit     Plan:  Cont lamictal 400 mg/day, can try  mg x 2 or cont current 200 mg bid, refills given  PCP follow up  Avoid sleep deprivation  See orders and medications filed with this encounter. The patient indicates understanding of these issues and agrees with the plan.  Discussed with patient in detail regarding the adverse and side effects of the medications.  RTC 12 months or prn

## 2024-02-19 DIAGNOSIS — G40.909 SEIZURE DISORDER (HCC): ICD-10-CM

## 2024-02-19 NOTE — TELEPHONE ENCOUNTER
Left message. Patient must schedule appointment for refills.       Medication: Lamotrigine 200 mg     Date of last refill: 01/15/2024 (#60/0)  [place]     Last office visit: 08/08/2022  Due back to clinic per last office note:  1 year  Date next office visit scheduled:    No future appointments.        Last OV note recommendation:    Assessment:   Seizure disorder (HCC)  (primary encounter diagnosis): complex partial seizure likely, requires AED; no seizure since last visit     Plan:  Cont lamictal 400 mg/day, can try  mg x 2 or cont current 200 mg bid, refills given  PCP follow up  Avoid sleep deprivation  See orders and medications filed with this encounter. The patient indicates understanding of these issues and agrees with the plan.  Discussed with patient in detail regarding the adverse and side effects of the medications.  RTC 12 months or prn

## 2024-02-20 RX ORDER — LAMOTRIGINE 200 MG/1
200 TABLET ORAL 2 TIMES DAILY
Qty: 142 TABLET | Refills: 0 | OUTPATIENT
Start: 2024-02-20

## 2024-02-21 NOTE — TELEPHONE ENCOUNTER
Patient notified that Dr Kramer refused medication refill as patient has not been seen in clinic since 8/8/2022 and she should ask her PCP for refills until her chante't with Dr Kramer on 4/8/24. Patient states she does not think her PCP will refill a seizure medication. Advised patient to notify PAPI/Dr Kramer if her PCP will not refill the Lamotrigine.

## 2024-04-08 ENCOUNTER — OFFICE VISIT (OUTPATIENT)
Dept: NEUROLOGY | Facility: CLINIC | Age: 64
End: 2024-04-08
Payer: COMMERCIAL

## 2024-04-08 VITALS
RESPIRATION RATE: 16 BRPM | BODY MASS INDEX: 32 KG/M2 | DIASTOLIC BLOOD PRESSURE: 72 MMHG | HEART RATE: 78 BPM | SYSTOLIC BLOOD PRESSURE: 126 MMHG | WEIGHT: 184 LBS

## 2024-04-08 DIAGNOSIS — G40.909 SEIZURE DISORDER (HCC): Primary | ICD-10-CM

## 2024-04-08 PROCEDURE — 3074F SYST BP LT 130 MM HG: CPT | Performed by: OTHER

## 2024-04-08 PROCEDURE — 3078F DIAST BP <80 MM HG: CPT | Performed by: OTHER

## 2024-04-08 PROCEDURE — 99214 OFFICE O/P EST MOD 30 MIN: CPT | Performed by: OTHER

## 2024-04-08 RX ORDER — LAMOTRIGINE 200 MG/1
200 TABLET ORAL 2 TIMES DAILY
Qty: 180 TABLET | Refills: 3 | Status: SHIPPED | OUTPATIENT
Start: 2024-04-08

## 2024-04-08 RX ORDER — ERGOCALCIFEROL 1.25 MG/1
CAPSULE ORAL
COMMUNITY

## 2024-04-08 NOTE — PROGRESS NOTES
Patient here to follow up regarding seizures. No seizures since last visit, has been doing well.

## 2024-04-08 NOTE — PROGRESS NOTES
[facility] Neurology Outpatient Progress Note  Date of service: 4/8/2024    Assessment:     ICD-10-CM    1. Seizure disorder (HCC)  G40.909 lamoTRIgine 200 MG Oral Tab        Seizure disorder (HCC)  (primary encounter diagnosis): complex partial seizure likely, requires AED; no seizure since last visit     Plan:  Cont lamictal 400 mg/day  PCP follow up  Avoid sleep deprivation  See orders and medications filed with this encounter. The patient indicates understanding of these issues and agrees with the plan.  Discussed with patient in detail regarding the adverse and side effects of the medications.  RTC 12 months      Subjective:   History:  Patient here to follow up regarding seizures. Has no seizure since last visit, she has been on lamictal 200 mg bid (took both together nightly).  She has retired, seems happy.  There was no reported convulsive seizure since 2003.  Per previous visit note:  Mojgan Amleida is a 60 year old female with seizures. Was diagnosed in 2003, has not had any episodes in years, she had grand mal type seizure initially and with abnormal EEG, but she has been seizure free on lamictal, no side effect reported, pt was on tegretol prior to lamictal. She told me her seizure sometimes presenting as \"aura-weird sensation that she could not exactly describe\". Brain MRI brain in the past was unremarkable.  No new focal weakness, numbness, vision, speech or gait disturbances..     History/Other:   REVIEW OF SYSTEMS:  A 10-point system was reviewed. Pertinent positives and negatives are noted as above       Current Outpatient Medications:     ergocalciferol 1.25 MG (13908 UT) Oral Cap, Take by mouth every 7 days., Disp: , Rfl:     lamoTRIgine 200 MG Oral Tab, Take 1 tablet (200 mg total) by mouth 2 (two) times daily., Disp: 180 tablet, Rfl: 3  Allergies:  Allergies   Allergen Reactions    Dander      Cats; congested     Past Medical History:   Diagnosis Date    Anxiety     Depression     Seizure disorder (HCC)       Past Surgical History:   Procedure Laterality Date    HAND/FINGER SURGERY UNLISTED      both hands, 2004    TUBAL LIGATION      1999     Social History:  Social History     Tobacco Use    Smoking status: Never    Smokeless tobacco: Never   Substance Use Topics    Alcohol use: Yes     Alcohol/week: 0.0 standard drinks of alcohol     Comment: rare     Family History   Problem Relation Age of Onset    Seizure Disorder Cousin     Seizure Disorder Paternal Uncle     Hypertension Mother       Objective:   Neurological Examination:  /72   Pulse 78   Resp 16   Wt 184 lb (83.5 kg)   LMP 12/24/2015   BMI 31.58 kg/m²   Mental status: A & O X 3  Language: no aphasia  Speech: no dysarthria  CN II-XII: intact   Motor strength: 5/5 all extremities  Tone: normal  DTRs: 2+ symmetric  Coordination: normal  Sensory: symmetric  Gait: normal    Test reviewed on 4/8/2024      Pablito Kramer MD (Michael)  Neurology  [facility]  4/8/2024, 11:02 AM  CC: JADA GIBSON

## 2024-04-08 NOTE — PATIENT INSTRUCTIONS
Refill policies:    Allow 2-3 business days for refills; controlled substances may take longer.  Contact your pharmacy at least 5 days prior to running out of medication and have them send an electronic request or submit request through the “request refill” option in your [employer] account.  Refills are not addressed on weekends; covering physicians do not authorize routine medications on weekends.  No narcotics or controlled substances are refilled after noon on Fridays or by on call physicians.  By law, narcotics must be electronically prescribed.  A 30 day supply with no refills is the maximum allowed.  If your prescription is due for a refill, you may be due for a follow up appointment.  To best provide you care, patients receiving routine medications need to be seen at least once a year.  Patients receiving narcotic/controlled substance medications need to be seen at least once every 3 months.  In the event that your preferred pharmacy does not have the requested medication in stock (e.g. Backordered), it is your responsibility to find another pharmacy that has the requested medication available.  We will gladly send a new prescription to that pharmacy at your request.    Scheduling Tests:    If your physician has ordered radiology tests such as MRI or CT scans, please contact Central Scheduling at [phone] right away to schedule the test.  Once scheduled, the [facility] Centralized Referral Team will work with your insurance carrier to obtain pre-certification or prior authorization.  Depending on your insurance carrier, approval may take 3-10 days.  It is highly recommended patients assure they have received an authorization before having a test performed.  If test is done without insurance authorization, patient may be responsible for the entire amount billed.      Precertification and Prior Authorizations:  If your physician has recommended that you have a procedure or additional testing performed the [facility]  Centralized Referral Team will contact your insurance carrier to obtain pre-certification or prior authorization.    You are strongly encouraged to contact your insurance carrier to verify that your procedure/test has been approved and is a COVERED benefit.  Although the [facility] Centralized Referral Team does its due diligence, the insurance carrier gives the disclaimer that \"Although the procedure is authorized, this does not guarantee payment.\"    Ultimately the patient is responsible for payment.   Thank you for your understanding in this matter.  Paperwork Completion:  If you require FMLA or disability paperwork for your recovery, please make sure to either drop it off or have it faxed to our office at [phone]. Be sure the form has your name and date of birth on it.  The form will be faxed to our Forms Department and they will complete it for you.  There is a 25$ fee for all forms that need to be filled out.  Please be aware there is a 10-14 day turnaround time.  You will need to sign a release of information (MALLORY) form if your paperwork does not come with one.  You may call the Forms Department with any questions at [phone].  Their fax number is [phone].

## 2024-04-23 ENCOUNTER — TELEPHONE (OUTPATIENT)
Dept: NEUROLOGY | Facility: CLINIC | Age: 64
End: 2024-04-23

## 2024-04-23 DIAGNOSIS — G40.909 SEIZURE DISORDER (HCC): ICD-10-CM

## 2024-04-23 RX ORDER — LAMOTRIGINE 200 MG/1
200 TABLET ORAL 2 TIMES DAILY
Qty: 180 TABLET | Refills: 3 | OUTPATIENT
Start: 2024-04-23

## 2024-06-05 ENCOUNTER — APPOINTMENT (OUTPATIENT)
Dept: DERMATOLOGY | Age: 64
End: 2024-06-05

## 2025-03-19 DIAGNOSIS — G40.909 SEIZURE DISORDER (HCC): ICD-10-CM

## 2025-03-19 RX ORDER — LAMOTRIGINE 200 MG/1
200 TABLET ORAL 2 TIMES DAILY
Qty: 180 TABLET | Refills: 0 | Status: SHIPPED | OUTPATIENT
Start: 2025-03-19

## 2025-03-19 NOTE — TELEPHONE ENCOUNTER
Medication:  LAMOTRIGINE 200 MG Oral Tab      Date of last refill: 04/08/2024 (#180/3)  [place]     Last office visit: 04/08/2024  Due back to clinic per last office note:  12 months  Date next office visit scheduled:    No future appointments.        Last OV note recommendation:    Plan:  Cont lamictal 400 mg/day  PCP follow up  Avoid sleep deprivation  See orders and medications filed with this encounter. The patient indicates understanding of these issues and agrees with the plan.  Discussed with patient in detail regarding the adverse and side effects of the medications.  RTC 12 months

## 2025-06-09 DIAGNOSIS — G40.909 SEIZURE DISORDER (HCC): ICD-10-CM

## 2025-06-09 RX ORDER — LAMOTRIGINE 200 MG/1
200 TABLET ORAL 2 TIMES DAILY
Qty: 180 TABLET | Refills: 0 | OUTPATIENT
Start: 2025-06-09

## 2025-06-09 NOTE — TELEPHONE ENCOUNTER
Left generic message to advise patient will need appointment. Left generic message.      Medication: Lamotrigine 200 mg     Date of last refill: 03/19/2025 (#180/0)  [place]     Last office visit: 04/08/2024  Due back to clinic per last office note:  12 months  Date next office visit scheduled:    No future appointments.        Last OV note recommendation:    Plan:  Cont lamictal 400 mg/day  PCP follow up  Avoid sleep deprivation  See orders and medications filed with this encounter. The patient indicates understanding of these issues and agrees with the plan.  Discussed with patient in detail regarding the adverse and side effects of the medications.  RTC 12 months

## 2025-06-11 NOTE — TELEPHONE ENCOUNTER
Patient contacted the office stating that she now has an appointment scheduled for Aug 4th and [place], to return back July 10th and would require a medication fill for her time away. Please contact patient at her request regarding the refill.

## 2025-06-12 RX ORDER — LAMOTRIGINE 200 MG/1
200 TABLET ORAL 2 TIMES DAILY
Qty: 60 TABLET | Refills: 0 | Status: SHIPPED | OUTPATIENT
Start: 2025-06-12

## 2025-06-12 NOTE — TELEPHONE ENCOUNTER
Future Appointments   Date Time Provider Department Center   8/4/2025  3:40 PM Pablito Kramer MD [facility] [address]

## 2025-07-08 DIAGNOSIS — G40.909 SEIZURE DISORDER (HCC): ICD-10-CM

## 2025-07-08 RX ORDER — LAMOTRIGINE 200 MG/1
200 TABLET ORAL 2 TIMES DAILY
Qty: 60 TABLET | Refills: 0 | Status: SHIPPED | OUTPATIENT
Start: 2025-07-08

## 2025-07-08 NOTE — TELEPHONE ENCOUNTER
Medication: LAMOTRIGINE 200 MG Oral Tab      Date of last refill: 06/12/2025 (#60/0)  [place]if [place] N/A     Last office visit: 04/08/2024  Due back to clinic per last office note:  12 months  Date next office visit scheduled:    Future Appointments   Date Time Provider Department Center   8/4/2025  3:40 PM Pablito Kramer MD [facility] [address]           Last OV note recommendation:    Assessment:       ICD-10-CM     1. Seizure disorder (HCC)  G40.909 lamoTRIgine 200 MG Oral Tab          Seizure disorder (HCC)  (primary encounter diagnosis): complex partial seizure likely, requires AED; no seizure since last visit     Plan:  Cont lamictal 400 mg/day  PCP follow up  Avoid sleep deprivation  See orders and medications filed with this encounter. The patient indicates understanding of these issues and agrees with the plan.  Discussed with patient in detail regarding the adverse and side effects of the medications.  RTC 12 months

## 2025-08-11 ENCOUNTER — OFFICE VISIT (OUTPATIENT)
Dept: NEUROLOGY | Facility: CLINIC | Age: 65
End: 2025-08-11

## 2025-08-11 VITALS
SYSTOLIC BLOOD PRESSURE: 122 MMHG | WEIGHT: 179 LBS | BODY MASS INDEX: 31 KG/M2 | DIASTOLIC BLOOD PRESSURE: 72 MMHG | RESPIRATION RATE: 16 BRPM | HEART RATE: 78 BPM

## 2025-08-11 DIAGNOSIS — G40.909 SEIZURE DISORDER (HCC): ICD-10-CM

## 2025-08-11 PROCEDURE — 99214 OFFICE O/P EST MOD 30 MIN: CPT | Performed by: OTHER

## 2025-08-11 RX ORDER — LAMOTRIGINE 200 MG/1
400 TABLET ORAL NIGHTLY
Qty: 180 TABLET | Refills: 3 | Status: SHIPPED | OUTPATIENT
Start: 2025-08-11

## (undated) DIAGNOSIS — G40.909 SEIZURE DISORDER (HCC): ICD-10-CM

## (undated) NOTE — MR AVS SNAPSHOT
[facility] [address]  [address] [place] [id number] [phone]               Thank you for choosing us for your health care visit with Jake Noel MD.  We are glad to serve you and happy to provide you with family member will be picking up prescription, office must be given name of individual in advance and they must present an ID as well. ? The name of the person picking up your prescription must be documented in your chart.   Scheduling Tests    If your phy Commonly known as:  PROZAC           lamoTRIgine 200 MG Tabs   Take 1 tablet (200 mg total) by mouth daily. What changed:    - medication strength  - when to take this   Commonly known as:  LAMICTAL           Omeprazole 40 MG Cpdr   daily. Eat plenty of protein, keep the fat content low Sugars:  sodas and sports drinks, candies and desserts   Eat plenty of low-fat dairy products High fat meats and dairy   Choose whole grain products Foods high in sodium   Water is best for hydration Fast delmi

## (undated) NOTE — LETTER
Date: 2019    Patient Name: Geovany Hanna  : 10/9/1960         To Whom it may concern: This letter has been written at the patient's request. The above patient was seen at the [facility] for treatment of a medical condition.     This